# Patient Record
Sex: FEMALE | NOT HISPANIC OR LATINO | URBAN - NONMETROPOLITAN AREA
[De-identification: names, ages, dates, MRNs, and addresses within clinical notes are randomized per-mention and may not be internally consistent; named-entity substitution may affect disease eponyms.]

---

## 2019-03-14 ENCOUNTER — OFFICE VISIT (OUTPATIENT)
Dept: OBSTETRICS AND GYNECOLOGY | Facility: CLINIC | Age: 69
End: 2019-03-14

## 2019-03-14 VITALS
BODY MASS INDEX: 44.9 KG/M2 | DIASTOLIC BLOOD PRESSURE: 92 MMHG | WEIGHT: 244 LBS | HEIGHT: 62 IN | SYSTOLIC BLOOD PRESSURE: 140 MMHG

## 2019-03-14 DIAGNOSIS — Z01.419 ENCOUNTER FOR CERVICAL PAP SMEAR WITH PELVIC EXAM: Primary | ICD-10-CM

## 2019-03-14 PROCEDURE — G0101 CA SCREEN;PELVIC/BREAST EXAM: HCPCS | Performed by: OBSTETRICS & GYNECOLOGY

## 2019-03-14 PROCEDURE — G0123 SCREEN CERV/VAG THIN LAYER: HCPCS | Performed by: OBSTETRICS & GYNECOLOGY

## 2019-03-14 RX ORDER — ASPIRIN 81 MG/1
162 TABLET, CHEWABLE ORAL DAILY
COMMUNITY

## 2019-03-14 RX ORDER — MELATONIN
1000 DAILY
COMMUNITY

## 2019-03-14 RX ORDER — MULTIPLE VITAMINS W/ MINERALS TAB 9MG-400MCG
1 TAB ORAL DAILY
COMMUNITY

## 2019-03-14 RX ORDER — VERAPAMIL HYDROCHLORIDE 40 MG/1
40 TABLET ORAL 2 TIMES DAILY
COMMUNITY

## 2019-03-14 RX ORDER — CLOPIDOGREL BISULFATE 75 MG/1
75 TABLET ORAL DAILY
COMMUNITY

## 2019-03-14 RX ORDER — IBUPROFEN 200 MG
200 TABLET ORAL AS NEEDED
COMMUNITY

## 2019-03-18 LAB
GEN CATEG CVX/VAG CYTO-IMP: NORMAL
LAB AP CASE REPORT: NORMAL
LAB AP GYN ADDITIONAL INFORMATION: NORMAL
LAB AP GYN OTHER FINDINGS: NORMAL
PATH INTERP SPEC-IMP: NORMAL
STAT OF ADQ CVX/VAG CYTO-IMP: NORMAL

## 2019-03-25 NOTE — PROGRESS NOTES
Ashley Reyes is a 68 y.o. y/o female.     Chief Complaint: Here for Pap smear    HPI:   68 y.o. y/o No obstetric history on file..  No LMP recorded. Patient is postmenopausal..  She is here for Pap smear current guidelines reviewed patient says she uses seatbelts.  Says she is up with colonoscopy          Review of Systems   ROS:  CNS: No history of brain malignancy  HEENT: No history of throat cancer  Eye: No history of retinal cancer  Pulmonary: No history of lung cancer                                                                                 Cardiovascular: No history of cardiac tumors  Gastrointestinal: No history of small bowel tumors  Renal: No history of kidney  Musculoskeletal: No history of smooth muscle tumors  Lymphatics: No history of of Hodgkin's disease  Endocrine: No history of thyroid malignancy    The following portions of the patient's history were reviewed and updated as appropriate: allergies, current medications, past family history, past medical history, past social history, past surgical history and problem list.    Allergies   Allergen Reactions   • Moxifloxacin Anaphylaxis   • Penicillins Other (See Comments)     States she had an injection when she was a child.  She passed out and was told not to take penicillin again  Syncope          Outpatient Medications Prior to Visit   Medication Sig Dispense Refill   • aspirin 81 MG chewable tablet Chew 162 mg Daily.     • cholecalciferol (VITAMIN D3) 1000 units tablet Take 1,000 Units by mouth Daily.     • clopidogrel (PLAVIX) 75 MG tablet Take 75 mg by mouth Daily.     • ibuprofen (ADVIL,MOTRIN) 200 MG tablet Take 200 mg by mouth As Needed.     • Multiple Vitamins-Minerals (MULTIVITAMIN WITH MINERALS) tablet tablet Take 1 tablet by mouth Daily.     • verapamil (CALAN) 40 MG tablet Take 40 mg by mouth 2 (Two) Times a Day.       No facility-administered medications prior to visit.         The patient has a family history of   No family  "history on file.     No past medical history on file.     OB History     No data available           Social History     Socioeconomic History   • Marital status: Unknown     Spouse name: Not on file   • Number of children: Not on file   • Years of education: Not on file   • Highest education level: Not on file   Tobacco Use   • Smoking status: Passive Smoke Exposure - Never Smoker   • Smokeless tobacco: Never Used   Substance and Sexual Activity   • Alcohol use: No     Frequency: Never   • Drug use: No        No past surgical history on file.     There is no problem list on file for this patient.       Documented Vitals    03/14/19 1104   BP: 140/92   Weight: 111 kg (244 lb)   Height: 157.5 cm (62\")   PainSc: 0-No pain        Body mass index is 44.63 kg/m².    Physical Exam  Constitutional: Appears to be in no acute distress; Eyes: sclera normal; Endocrine system: thyroid palpate is normal; Pulmonary system: lungs clear; Cardiovascular system: heart regular rate and rhythm; Gastrointestinal system: abdomen soft nontender, active bowel sounds; Urologic system: CVA negative; Psychiatric: appropriate insight; Neurologic: gait within normal limits female reproductive system: Vulva normal, urethra normal, bladder palpates as normal, vagina appears and palpates as normal, cervix appears normal cytology obtained, uterus palpates as normal, adnexa on the right cannot be palpated adnexa on the left cannot be palpated    Assessment        Diagnosis Plan   1. Encounter for cervical Pap smear with pelvic exam  Liquid-based Pap Smear, Screening         Plan       No orders of the defined types were placed in this encounter.              This document has been electronically signed by Sumeet Anderson MD on March 24, 2019 11:40 PM    "

## 2023-03-22 ENCOUNTER — CLINICAL SUPPORT (OUTPATIENT)
Dept: FAMILY MEDICINE CLINIC | Facility: CLINIC | Age: 73
End: 2023-03-22
Payer: MEDICARE

## 2023-03-22 DIAGNOSIS — Z00.00 ROUTINE MEDICAL EXAM: ICD-10-CM
